# Patient Record
Sex: FEMALE | Employment: UNEMPLOYED | URBAN - METROPOLITAN AREA
[De-identification: names, ages, dates, MRNs, and addresses within clinical notes are randomized per-mention and may not be internally consistent; named-entity substitution may affect disease eponyms.]

---

## 2017-01-14 ENCOUNTER — HOSPITAL ENCOUNTER (OUTPATIENT)
Dept: MAMMOGRAPHY | Facility: HOSPITAL | Age: 47
Discharge: HOME | End: 2017-01-14
Attending: INTERNAL MEDICINE
Payer: COMMERCIAL

## 2017-01-14 DIAGNOSIS — Z12.31 ENCOUNTER FOR SCREENING MAMMOGRAM FOR MALIGNANT NEOPLASM OF BREAST: ICD-10-CM

## 2017-01-14 PROCEDURE — 77067 SCR MAMMO BI INCL CAD: CPT

## 2023-11-29 ENCOUNTER — TELEPHONE (OUTPATIENT)
Dept: UROLOGY | Facility: CLINIC | Age: 53
End: 2023-11-29

## 2023-11-29 NOTE — TELEPHONE ENCOUNTER
Used  #[id number]. Contacted home and mobile numbers and LVM for patient to call back to reschedule NP appointment per physician due to Physician-Personal.  Patient's appt for 11/30 cancelled. Per physician, to reschedule patient for NP appt on 12/6 at 9am, if unable to make that appointment patient will need to be contacted again should physician determine another date and time to reschedule.

## 2023-11-29 NOTE — TELEPHONE ENCOUNTER
Patient on other line with spouse as . Patient and spouse accepted and confirmed rescheduled NP appointment for 12/6 at [address] [place] [address] per physician. Patient and spouse were provided with NP instructions and expressed understanding.

## 2023-12-06 ENCOUNTER — OFFICE VISIT (OUTPATIENT)
Dept: UROLOGY | Facility: CLINIC | Age: 53
End: 2023-12-06
Attending: OBSTETRICS & GYNECOLOGY
Payer: COMMERCIAL

## 2023-12-06 VITALS
BODY MASS INDEX: 27.05 KG/M2 | DIASTOLIC BLOOD PRESSURE: 60 MMHG | SYSTOLIC BLOOD PRESSURE: 102 MMHG | WEIGHT: 147 LBS | TEMPERATURE: 97 F | HEIGHT: 62 IN

## 2023-12-06 DIAGNOSIS — R31.29 HEMATURIA, MICROSCOPIC: ICD-10-CM

## 2023-12-06 DIAGNOSIS — N39.41 URGE INCONTINENCE: Primary | ICD-10-CM

## 2023-12-06 DIAGNOSIS — N39.3 URINARY, INCONTINENCE, STRESS FEMALE: ICD-10-CM

## 2023-12-06 LAB
BILIRUB UR QL STRIP.AUTO: NEGATIVE
CLARITY UR REFRACT.AUTO: CLEAR
COLOR UR AUTO: YELLOW
CONTROL RUN WITHIN 24 HOURS?: YES
GLUCOSE UR STRIP.AUTO-MCNC: >1000 MG/DL
KETONES UR STRIP.AUTO-MCNC: NEGATIVE MG/DL
LEUKOCYTE ESTERASE UR QL STRIP.AUTO: NEGATIVE
LEUKOCYTE ESTERASE URINE: NEGATIVE
NITRITE UR QL STRIP.AUTO: NEGATIVE
NITRITE URINE: NEGATIVE
PH UR STRIP.AUTO: 5 [PH] (ref 5–8)
PROT UR STRIP.AUTO-MCNC: NEGATIVE MG/DL
RBC UR QL AUTO: NEGATIVE
SP GR UR STRIP.AUTO: 1.03 (ref 1–1.03)
UROBILINOGEN UR STRIP.AUTO-MCNC: NORMAL MG/DL

## 2023-12-06 PROCEDURE — 87086 URINE CULTURE/COLONY COUNT: CPT | Performed by: OBSTETRICS & GYNECOLOGY

## 2023-12-06 PROCEDURE — 81002 URINALYSIS NONAUTO W/O SCOPE: CPT | Performed by: OBSTETRICS & GYNECOLOGY

## 2023-12-06 PROCEDURE — 99202 OFFICE O/P NEW SF 15 MIN: CPT

## 2023-12-06 PROCEDURE — 51798 US URINE CAPACITY MEASURE: CPT | Performed by: OBSTETRICS & GYNECOLOGY

## 2023-12-06 PROCEDURE — 81003 URINALYSIS AUTO W/O SCOPE: CPT | Performed by: OBSTETRICS & GYNECOLOGY

## 2023-12-06 RX ORDER — SOLIFENACIN SUCCINATE 5 MG/1
5 TABLET, FILM COATED ORAL DAILY
Qty: 30 TABLET | Refills: 11 | Status: SHIPPED | OUTPATIENT
Start: 2023-12-06

## 2023-12-06 RX ORDER — INSULIN GLARGINE AND LIXISENATIDE 100; 33 U/ML; UG/ML
INJECTION, SOLUTION SUBCUTANEOUS
COMMUNITY
Start: 2023-11-24

## 2023-12-06 RX ORDER — ROSUVASTATIN CALCIUM 5 MG/1
TABLET, COATED ORAL
COMMUNITY
Start: 2023-11-30

## 2023-12-06 RX ORDER — LISINOPRIL 2.5 MG/1
TABLET ORAL
COMMUNITY
Start: 2023-11-29

## 2023-12-06 RX ORDER — KETOCONAZOLE 20 MG/ML
SHAMPOO TOPICAL
COMMUNITY
Start: 2023-11-25

## 2023-12-06 NOTE — PROCEDURES
Patient voided 190mL in the privacy of the bathroom, collected a clean catch specimen, see dipstick, Sent for UA and culture. PVR=65 mL per bladder scan.

## 2024-01-24 ENCOUNTER — TELEPHONE (OUTPATIENT)
Dept: UROLOGY | Facility: CLINIC | Age: 54
End: 2024-01-24

## 2024-01-24 NOTE — TELEPHONE ENCOUNTER
Outgoing call to patient via  Leo [phone] to remind pt of need for ct abdomen and pelvic per Dr Acosta order on 12/6/23 for microhematuria  Central scheduling number provided with explanation of need for test  No further questions

## 2024-02-13 NOTE — PROGRESS NOTES
ID: Cecilia Phelan  : 1970  Date: 24      Chief Complaint   Patient presents with    Cystourethroscopy Procedure       HPI:  53 year old female, G4, Vaginal deliveries x3,  x1, who was originally seen on 23. To summarize, she presented for evaluation of urinary leaking for the past 4 months.  She has urgency and UUI.    Reports some INGA, but mostly bothered by UUI.  We don't have records of her referral or if she has had urinalaysis done.  No dysuria or UTI symptoms.  Gives a history of \"bladder surgery\" 16 years ago for leaking (sling ?)  Has a uterus.  Menopausal  No dyspareunia or coital incontinence  + Constipation.  PMHx: + DM, on insulin.  BMI of 26.     Initial exam demonstrated normal pelvic exam, mild rectocele. UA and urine cultures negative. Recommended Vesicare 5 mg and cystoscopy to evaluate LUTS. Returns for follow up.     Interval history:  Has been taking Vesicare since last visit and reports symptom improvement.    No longer reports UUI or INGA.   No side effects  Bowels are OK.     Urogynecology Summary:  Urogynecology Summary  Prolapse: No  INGA: No  Urge Incontinence: No  Nocturia Frequency: 0  Frequency: 2 - 3 hours  Incomplete emptying: No  Constipation: No (fiber and salads)      Review of Systems:    A comprehensive 12 point review of systems was completed.  Pertinent positives noted in the the HPI.  Denies CP  Denies SOB    Vitals:  Temp 97.9 °F (36.6 °C) (Temporal)   Resp 17   Ht 62\"   Wt 147 lb (66.7 kg)   BMI 26.89 kg/m²      General:  Alert and oriented. Well-nourished, normally developed.  Thought and emotional status are appropriate, speech is understandable.  No acute distress.  Pelvic: deferred.     Procedure Note:  After obtaining appropriated consent, procedural pause was performed. The patient was properly positioned and the urethra was cleansed with antiseptic. 2% lidocaine was placed in the urethra for local analgesia.  An Endosee catheter was  inserted and the urethra and bladder were inspected with the following findings:  Urethra: Normal, no lesions, no mesh erostion  Urethrovesical junction: Normal.  Bladder: The bladder was filled by gravity to capacity.  The trigone was normal. The ureteral orifices were normally located and demonstrated normal ejection of urine.  There were no bladder trabeculations, urothelial lesions, stones, cysts or tumor growth. No mesh erosion.      Impression:    ICD-10-CM    1. Urge incontinence  N39.41 Cystourethroscopy      2. Urgency of micturition  R39.15             Plan:  Patient and  reassured with cystoscopy.  She has improvement since taking OAB meds.  Plan to continue Solifenacin 5 mg PO daily.  No need to proceed with imaging of the urinary tract at this point. Follow up in 6 months with FERNANDO Chester (phone visit OK).          Edilma Acosta MD, FACOG, FACS  Female Pelvic Medicine and  Reconstructive Surgery (Urogynecology)  [phone] (Pager)

## 2024-02-14 ENCOUNTER — OFFICE VISIT (OUTPATIENT)
Dept: UROLOGY | Facility: CLINIC | Age: 54
End: 2024-02-14
Attending: OBSTETRICS & GYNECOLOGY
Payer: COMMERCIAL

## 2024-02-14 VITALS — WEIGHT: 147 LBS | BODY MASS INDEX: 27.05 KG/M2 | RESPIRATION RATE: 17 BRPM | TEMPERATURE: 98 F | HEIGHT: 62 IN

## 2024-02-14 DIAGNOSIS — R39.15 URGENCY OF MICTURITION: ICD-10-CM

## 2024-02-14 DIAGNOSIS — N39.41 URGE INCONTINENCE: Primary | ICD-10-CM

## 2024-02-14 PROCEDURE — 99212 OFFICE O/P EST SF 10 MIN: CPT

## 2024-02-14 PROCEDURE — 52000 CYSTOURETHROSCOPY: CPT

## 2024-02-14 RX ORDER — LIDOCAINE HYDROCHLORIDE 20 MG/ML
10 JELLY TOPICAL ONCE
Status: COMPLETED | OUTPATIENT
Start: 2024-02-14 | End: 2024-02-14

## 2024-02-14 RX ADMIN — LIDOCAINE HYDROCHLORIDE 10 ML: 20 JELLY TOPICAL at 14:30:00

## 2024-02-14 NOTE — PATIENT INSTRUCTIONS
[facility] UROGYNECOLOGY  [address] [address]  PH: [phone]  FAX: [phone]       Cystoscopy Discharge Instructions    You may have some mild discomfort today from your cystoscopy.  There may be mild burning when you urinate or you may see blood in your urine.  These problems should not last more than 24 hours.    Comfort measures:    Drink two 8oz glasses of water every hour for the next two hours  Tylenol or Ibuprofen may be taken as needed  A warm compress or sitz bath may be soothing    Signs of possible infection:    Fever above 100 degrees  Painful or burning urination  Urinary frequency or urgency  Blood in urine  Chills    If you experience any of these symptoms, please call the office or, if after hours, the on-call physician at [phone].    Patient Signature:    Date:

## 2024-02-14 NOTE — PROCEDURES
Assisted MD with cysto, prepped pt with use of     The patient's goals for the shift include      The clinical goals for the shift include Remain free of falls.    Over the shift, the patient did not make progress toward the following goals. Barriers to progression include history of falls. Recommendations to address these barriers include use of a bed alarm and frequent rounding.

## 2024-02-16 NOTE — PROGRESS NOTES
Patient here for cystoscopy.  Procedure explained and confirmed by patient.  Patient urine dipstick performed.  Physician notified and assisted physician during procedure.  Both verbal and written discharge instructions given.  Patient tolerated procedure well, to follow-up as directed by  Dr. Edilma Acosta.

## 2025-03-27 DIAGNOSIS — N39.41 URGE INCONTINENCE: ICD-10-CM

## 2025-03-27 RX ORDER — SOLIFENACIN SUCCINATE 5 MG/1
5 TABLET, FILM COATED ORAL DAILY
Qty: 30 TABLET | Refills: 11 | OUTPATIENT
Start: 2025-03-27